# Patient Record
Sex: MALE | Race: WHITE | Employment: FULL TIME | URBAN - METROPOLITAN AREA
[De-identification: names, ages, dates, MRNs, and addresses within clinical notes are randomized per-mention and may not be internally consistent; named-entity substitution may affect disease eponyms.]

---

## 2018-05-25 ENCOUNTER — OFFICE VISIT (OUTPATIENT)
Dept: ORTHOPEDIC SURGERY | Age: 58
End: 2018-05-25

## 2018-05-25 VITALS — WEIGHT: 165 LBS | BODY MASS INDEX: 23.62 KG/M2 | HEIGHT: 70 IN

## 2018-05-25 DIAGNOSIS — M25.512 LEFT SHOULDER PAIN, UNSPECIFIED CHRONICITY: Primary | ICD-10-CM

## 2018-05-25 PROCEDURE — 99203 OFFICE O/P NEW LOW 30 MIN: CPT | Performed by: ORTHOPAEDIC SURGERY

## 2018-05-25 RX ORDER — ATORVASTATIN CALCIUM 10 MG/1
10 TABLET, FILM COATED ORAL
COMMUNITY
Start: 2017-08-25

## 2018-05-25 RX ORDER — IBUPROFEN 200 MG
TABLET ORAL
COMMUNITY

## 2018-05-25 RX ORDER — ASPIRIN 81 MG/1
TABLET ORAL
COMMUNITY

## 2018-07-06 ENCOUNTER — OFFICE VISIT (OUTPATIENT)
Dept: ORTHOPEDIC SURGERY | Age: 58
End: 2018-07-06

## 2018-07-06 VITALS — BODY MASS INDEX: 23.62 KG/M2 | HEIGHT: 70 IN | WEIGHT: 165 LBS

## 2018-07-06 DIAGNOSIS — M25.512 LEFT SHOULDER PAIN, UNSPECIFIED CHRONICITY: Primary | ICD-10-CM

## 2018-07-06 PROCEDURE — 99213 OFFICE O/P EST LOW 20 MIN: CPT | Performed by: ORTHOPAEDIC SURGERY
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
## 2018-07-23 ENCOUNTER — OFFICE VISIT (OUTPATIENT)
Dept: ORTHOPEDIC SURGERY | Age: 58
End: 2018-07-23

## 2018-07-23 VITALS — BODY MASS INDEX: 23.62 KG/M2 | WEIGHT: 165 LBS | HEIGHT: 70 IN

## 2018-07-23 DIAGNOSIS — M25.512 LEFT SHOULDER PAIN, UNSPECIFIED CHRONICITY: Primary | ICD-10-CM

## 2018-07-23 PROCEDURE — 99213 OFFICE O/P EST LOW 20 MIN: CPT | Performed by: ORTHOPAEDIC SURGERY
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
## 2018-07-27 ENCOUNTER — HOSPITAL ENCOUNTER (OUTPATIENT)
Dept: PHYSICAL THERAPY | Age: 58
Setting detail: THERAPIES SERIES
Discharge: HOME | End: 2018-07-27
Payer: COMMERCIAL

## 2018-07-27 PROCEDURE — G8984 CARRY CURRENT STATUS: HCPCS | Performed by: PHYSICAL THERAPIST

## 2018-07-27 PROCEDURE — 97110 THERAPEUTIC EXERCISES: CPT | Performed by: PHYSICAL THERAPIST

## 2018-07-27 PROCEDURE — G8985 CARRY GOAL STATUS: HCPCS | Performed by: PHYSICAL THERAPIST

## 2018-07-27 PROCEDURE — 97016 VASOPNEUMATIC DEVICE THERAPY: CPT | Performed by: PHYSICAL THERAPIST

## 2018-07-27 PROCEDURE — 97161 PT EVAL LOW COMPLEX 20 MIN: CPT | Performed by: PHYSICAL THERAPIST

## 2018-07-27 NOTE — PLAN OF CARE
[]Cognitive Function, education/learning barriers              []Environmental, home barriers              []profession/work barriers  []past PT/medical experience  []other:  Justification: Patient is an active 67 y/o M with no anticipated factors that will affect rehab potential.     Falls Risk Assessment (30 days): no formal assessment, but not intervention anticipated  [x] Falls Risk assessed and no intervention required. [] Falls Risk assessed and Patient requires intervention due to being higher risk   TUG score (>12s at risk):     [] Falls education provided, including       G-Codes:  PT G-Codes  Functional Assessment Tool Used: Quick Dash  Score: 18%  Functional Limitation: Carrying, moving and handling objects  Carrying, Moving and Handling Objects Current Status (): At least 1 percent but less than 20 percent impaired, limited or restricted  Carrying, Moving and Handling Objects Goal Status ():  At least 1 percent but less than 20 percent impaired, limited or restricted    ASSESSMENT:   Functional Impairments   []Noted spinal or UE joint hypomobility   []Noted spinal or UE joint hypermobility   [x]Decreased UE functional ROM (slightly)   [x]Decreased UE functional strength   []Abnormal reflexes/sensation/myotomal/dermatomal deficits   [x]Decreased RC/scapular/core strength and neuromuscular control   []other:      Functional Activity Limitations (from functional questionnaire and intake)   []Reduced ability to tolerate prolonged functional positions   []Reduced ability or difficulty with changes of positions or transfers between positions   []Reduced ability to maintain good posture and demonstrate good body mechanics with sitting, bending, and lifting   [] Reduced ability or tolerance with driving and/or computer work   []Reduced ability to sleep   [x]Reduced ability to perform lifting, reaching, carrying tasks   [x]Reduced ability to tolerate impact through UE   [x]Reduced ability to reach

## 2018-07-27 NOTE — FLOWSHEET NOTE
[facility], [address] [place]  Phone: [phone]  Fax [phone]    Physical Therapy Daily Treatment Note  Date:  2018    Patient Name:  Caleb Velásquez    :  1960  MRN: [id number]  Restrictions/Precautions:  Patient denied, none present  Physician Information:  Referring Practitioner: Dr. Liliam Sidhu  Medical/Treatment Diagnosis Information:  · Diagnosis: L shoulder pain (M25.512)  · Treatment Diagnosis:Left shoulder pain (M25.512), Left shoudler weakness (R53.1)                                  [x] Conservative / [] Surgical - DOS:  Therapy Diagnosis/Practice Pattern:  Practice Pattern E: Localized Inflammation  Insurance/Certification information:  PT Insurance Information: [address] [place] [address] of [facility] signed: [] YES  [] NO  Number of Comorbidities:  [x]0     []1-2    []3+  Date of Patient follow up with Physician:  18    G-Code (if applicable):      Date G-Code Applied:  18  PT G-Codes  Functional Assessment Tool Used: Susanne Sousa  Score: 18%  Functional Limitation: Carrying, moving and handling objects  Carrying, Moving and Handling Objects Current Status (): At least 1 percent but less than 20 percent impaired, limited or restricted  Carrying, Moving and Handling Objects Goal Status ():  At least 1 percent but less than 20 percent impaired, limited or restricted    Progress Note: [x]  Yes  []  No  Next due by: Visit #10        Latex Allergy:  [x]NO      []YES  Preferred Language for Healthcare:   [x]English       []other:    Visit # Insurance Allowable Reporting Period   1 Check auth Begin Date: 2018               End Date:      RECERT DUE BY: 6 weeks     SUBJECTIVE:  See eval    OBJECTIVE: See eval  Observation:  Palpation:     Test used Initial score Current Score   Pain Summary VAS 0-3/10    Functional questionnaire QDash 18%    ROM flexion 168          ABD      IR T8    Strength flexion 4+     ER 4 pain     ABD 4 pain     IR 4 pain       RESTRICTIONS/PRECAUTIONS: none present, patient denied   MRI right shoulder dated 7/13/18 demonstrates degenerative changes of the acromioclavicular joint moderate in severity. Evidence of subacromial impingement with thickening of the coracoacromial ligament and inflammation around the bursa in the subacromial space with mild subscapularis tendinopathy and inflammation in the rotator interval    Exercises/Interventions:   Therapeutic Ex Sets/reps Notes   Posterior capsule stretch 4 x 20\" HEP   Sleeper stretch 4 x 20\" HEP   S/L ER X 20 HEP3#   S/L ABD X 20 HEP;3#   No Money X 20 HEP   Corner Stretch 4 x 20\" HEP   IR stretch with strap 4 x 20\" HEP   Doorway stretch 4 x 20\" HEP   SL ADD stretch 4 x 20\"    True Stretch NV    TB ER/IR NV    3D wall slide NV    Lateral wall slide NV                                  Manual Intervention     PROM 3'                                  NMR re-education                                                 Therapeutic Exercise and NMR EXR  [x] (23917) Provided verbal/tactile cueing for activities related to strengthening, flexibility, endurance, ROM  for improvements in scapular, scapulothoracic and UE control with self care, reaching, carrying, lifting, house/yardwork, driving/computer work.    [] (24078) Provided verbal/tactile cueing for activities related to improving balance, coordination, kinesthetic sense, posture, motor skill, proprioception  to assist with  scapular, scapulothoracic and UE control with self care, reaching, carrying, lifting, house/yardwork, driving/computer work.     Therapeutic Activities:    [] (72157 or 40430) Provided verbal/tactile cueing for activities related to improving balance, coordination, kinesthetic sense, posture, motor skill, proprioception and motor activation to allow for proper function of scapular, scapulothoracic and UE control with self care, carrying, lifting, driving/computer work.     Home Exercise Program:    [x] (99294) Reviewed/Progressed HEP activities related to strengthening, flexibility, endurance, ROM of scapular, scapulothoracic and UE control with self care, reaching, carrying, lifting, house/yardwork, driving/computer work  [] (52927) Reviewed/Progressed HEP activities related to improving balance, coordination, kinesthetic sense, posture, motor skill, proprioception of scapular, scapulothoracic and UE control with self care, reaching, carrying, lifting, house/yardwork, driving/computer work      Manual Treatments:  PROM / STM / Oscillations-Mobs:  G-I, II, III, IV (PA's, Inf., Post.)  [x] (17969) Provided manual therapy to mobilize soft tissue/joints of cervical/CT, scapular GHJ and UE for the purpose of modulating pain, promoting relaxation,  increasing ROM, reducing/eliminating soft tissue swelling/inflammation/restriction, improving soft tissue extensibility and allowing for proper ROM for normal function with self care, reaching, carrying, lifting, house/yardwork, driving/computer work    Modalities:  Vaso x 15'    Charges:  Timed Code Treatment Minutes: 25   Total Treatment Minutes: 60     [x] EVAL (LOW) 28911 (typically 20 minutes face-to-face)  [] EVAL (MOD) 82603 (typically 30 minutes face-to-face)  [] EVAL (HIGH) 423 8935 (typically 45 minutes face-to-face)  [] RE-EVAL     [x] GF(14155) x  2   [] IONTO  [] NMR (98900) x      [x] VASO  [] Manual ([id number]) x       [] Other:  [] TA x       [] Mech Traction (84802)  [] ES(attended) (67884)      [] ES (un) (03497):     Letty Pina stated goal: pain to go away     Therapist goals for Patient:   Short Term Goals: To be achieved in: 2 weeks  1. Independent in HEP and progression per patient tolerance, in order to prevent re-injury. 2. Patient will have a decrease in pain to facilitate improvement in movement, function, and ADLs as indicated by Functional Deficits.     Long Term Goals: To be achieved in: 6 weeks  1. Disability index score of 11% or less for the [facility] to assist with reaching prior level of function. 2. Patient will demonstrate increased AROM to WNL R UE to allow for proper joint functioning as indicated by patients Functional Deficits. 3. Patient will demonstrate an increase in Strength to at least 4+/5 to allow for proper functional mobility as indicated by patients Functional Deficits. 4. Patient will return to all functional activities without increased symptoms or restriction. 5. Patient will return to gym weight lifting routine without increased symptoms.             New or Updated Goals (if applicable):  [x] No change to goals established upon initial eval/last progress note:  New Goals:    Progression Towards Functional goals:   [] Patient is progressing as expected towards functional goals listed. [] Progression is slowed due to complexities listed. [] Progression has been slowed due to co-morbidities. [x] Plan just implemented, too soon to assess goals progression  [] Other:     ASSESSMENT:    [] Improvement noted relative to goals:  [] No Improvement noted related to goals:  Summary/Patient's response to treatment: See Eval    Treatment/Activity Tolerance:  [x] Patient tolerated treatment well [] Patient limited by fatique  [] Patient limited by pain  [] Patient limited by other medical complications  [] Other:     Prognosis: [x] Good [] Fair  [] Poor    Patient Requires Follow-up: [x] Yes  [] No    PLAN: See eval  [] Continue per plan of care [] Alter current plan (see comments)  [x] Plan of care initiated [] Hold pending MD visit [] Discharge    Electronically signed by: ALOK Ulloa SPT  Therapist was present, directed the patient's care, made skilled judgement, and was responsible for assessment and treatment of the patient.

## 2018-08-02 ENCOUNTER — HOSPITAL ENCOUNTER (OUTPATIENT)
Dept: PHYSICAL THERAPY | Age: 58
Setting detail: THERAPIES SERIES
Discharge: HOME | End: 2018-08-02
Payer: COMMERCIAL

## 2018-08-02 PROCEDURE — 97016 VASOPNEUMATIC DEVICE THERAPY: CPT | Performed by: PHYSICAL THERAPIST

## 2018-08-02 PROCEDURE — 97110 THERAPEUTIC EXERCISES: CPT | Performed by: PHYSICAL THERAPIST

## 2018-08-02 NOTE — FLOWSHEET NOTE
Increased flexion ROM  Palpation:     Test used Initial score Current Score   Pain Summary VAS 0-3/10    Functional questionnaire QDash 18%    ROM flexion 168          ABD      IR T8    Strength flexion 4+     ER 4 pain     ABD 4 pain     IR 4 pain       RESTRICTIONS/PRECAUTIONS: none present, patient denied   MRI right shoulder dated 7/13/18 demonstrates degenerative changes of the acromioclavicular joint moderate in severity.   Evidence of subacromial impingement with thickening of the coracoacromial ligament and inflammation around the bursa in the subacromial space with mild subscapularis tendinopathy and inflammation in the rotator interval    Exercises/Interventions:   Therapeutic Ex Sets/reps Notes   Posterior capsule stretch 4 x 20\" HEP   Sleeper stretch 4 x 20\" HEP   S/L ER X 20 HEP4#   S/L ABD X 20 HEP;4#   No Money X 20 Red; HEP   Corner Stretch  HEP   IR stretch with strap 4 x 20\" HEP   Doorway stretch  HEP   SL ADD stretch     True Stretch  -flexion squat  -IR  -ER low 4 x 20\"    TB ER/IR X 15 HEP; grn ER, blue IR   3D wall slide X 5 HEP   Lateral wall slide NV    SA punch; forward/backward; side/side X 20 HEP   Prone extension X 20 HEP; 4#    X 5 each side                   Manual Intervention     PROM 5'                                  NMR re-education                                                 Therapeutic Exercise and NMR EXR  [x] (79518) Provided verbal/tactile cueing for activities related to strengthening, flexibility, endurance, ROM  for improvements in scapular, scapulothoracic and UE control with self care, reaching, carrying, lifting, house/yardwork, driving/computer work.    [] (83348) Provided verbal/tactile cueing for activities related to improving balance, coordination, kinesthetic sense, posture, motor skill, proprioception  to assist with  scapular, scapulothoracic and UE control with self care, reaching, carrying, lifting, house/yardwork, driving/computer work.    Therapeutic Activities:    [] (22548 or 37847) Provided verbal/tactile cueing for activities related to improving balance, coordination, kinesthetic sense, posture, motor skill, proprioception and motor activation to allow for proper function of scapular, scapulothoracic and UE control with self care, carrying, lifting, driving/computer work.      Home Exercise Program:    [x] (83882) Reviewed/Progressed HEP activities related to strengthening, flexibility, endurance, ROM of scapular, scapulothoracic and UE control with self care, reaching, carrying, lifting, house/yardwork, driving/computer work  [] (28535) Reviewed/Progressed HEP activities related to improving balance, coordination, kinesthetic sense, posture, motor skill, proprioception of scapular, scapulothoracic and UE control with self care, reaching, carrying, lifting, house/yardwork, driving/computer work      Manual Treatments:  PROM / STM / Oscillations-Mobs:  G-I, II, III, IV (PA's, Inf., Post.)  [x] (64619) Provided manual therapy to mobilize soft tissue/joints of cervical/CT, scapular GHJ and UE for the purpose of modulating pain, promoting relaxation,  increasing ROM, reducing/eliminating soft tissue swelling/inflammation/restriction, improving soft tissue extensibility and allowing for proper ROM for normal function with self care, reaching, carrying, lifting, house/yardwork, driving/computer work    Modalities:  Vaso x 15'    Charges:  Timed Code Treatment Minutes: 35   Total Treatment Minutes: 50     [] EVAL (LOW) 68815 (typically 20 minutes face-to-face)  [] EVAL (MOD) 44107 (typically 30 minutes face-to-face)  [] EVAL (HIGH) 27069 (typically 45 minutes face-to-face)  [] RE-EVAL     [x] FL(74377) x  2   [] IONTO  [] NMR (96208) x      [x] VASO  [] Manual (23238) x       [] Other:  [] TA x       [] Mech Traction (47142)  [] ES(attended) (31880)      [] ES (un) (64570):     Suzie Graham stated goal: pain to go away     Therapist goals for

## 2018-08-10 ENCOUNTER — HOSPITAL ENCOUNTER (OUTPATIENT)
Dept: PHYSICAL THERAPY | Age: 58
Setting detail: THERAPIES SERIES
Discharge: HOME | End: 2018-08-10
Payer: COMMERCIAL

## 2018-08-10 PROCEDURE — 97110 THERAPEUTIC EXERCISES: CPT

## 2018-08-10 PROCEDURE — 97140 MANUAL THERAPY 1/> REGIONS: CPT

## 2018-08-10 NOTE — FLOWSHEET NOTE
range.    OBJECTIVE:  Observation: Increased flexion ROM  Palpation:     Test used Initial score Current Score   Pain Summary VAS 0-3/10    Functional questionnaire QDash 18%    ROM flexion 168          ABD      IR T8    Strength flexion 4+     ER 4 pain     ABD 4 pain     IR 4 pain       RESTRICTIONS/PRECAUTIONS: none present, patient denied   MRI right shoulder dated 7/13/18 demonstrates degenerative changes of the acromioclavicular joint moderate in severity.   Evidence of subacromial impingement with thickening of the coracoacromial ligament and inflammation around the bursa in the subacromial space with mild subscapularis tendinopathy and inflammation in the rotator interval    Exercises/Interventions:   Therapeutic Ex Sets/reps Notes   Posterior capsule stretch 4 x 20\" HEP   Sleeper stretch 4 x 20\" HEP   S/L ER X 20 HEP4#   S/L ABD X 20 HEP;4#   No Money X 20 Red; HEP   Corner Stretch  HEP   IR stretch with strap 4 x 20\" HEP   Doorway stretch  HEP   SL ADD stretch     True Stretch  -flexion squat  -IR  -ER low 4 x 20\"    TB row/ext  TB ER/IR 2 x 10  2 x 10 GTB  HEP; grn ER, blue IR   3D wall slide X 5 HEP   Lateral wall slide NV    SA punch; forward/backward; side/side X 20 5# HEP   Prone extension, row X 20 HEP; 4#    punch X 10 RTB                  Manual Intervention     PROM, jt mobs 6 min    Cross friction bicep tendon 3 min                             NMR re-education                                                 Therapeutic Exercise and NMR EXR  [x] (65793) Provided verbal/tactile cueing for activities related to strengthening, flexibility, endurance, ROM  for improvements in scapular, scapulothoracic and UE control with self care, reaching, carrying, lifting, house/yardwork, driving/computer work.    [] (42253) Provided verbal/tactile cueing for activities related to improving balance, coordination, kinesthetic sense, posture, motor skill, proprioception  to assist with  scapular, scapulothoracic and UE control with self care, reaching, carrying, lifting, house/yardwork, driving/computer work. Therapeutic Activities:    [] (02396 or 03016) Provided verbal/tactile cueing for activities related to improving balance, coordination, kinesthetic sense, posture, motor skill, proprioception and motor activation to allow for proper function of scapular, scapulothoracic and UE control with self care, carrying, lifting, driving/computer work.      Home Exercise Program:    [x] (60929) Reviewed/Progressed HEP activities related to strengthening, flexibility, endurance, ROM of scapular, scapulothoracic and UE control with self care, reaching, carrying, lifting, house/yardwork, driving/computer work  [] (95712) Reviewed/Progressed HEP activities related to improving balance, coordination, kinesthetic sense, posture, motor skill, proprioception of scapular, scapulothoracic and UE control with self care, reaching, carrying, lifting, house/yardwork, driving/computer work      Manual Treatments:  PROM / STM / Oscillations-Mobs:  G-I, II, III, IV (PA's, Inf., Post.)  [x] (79277) Provided manual therapy to mobilize soft tissue/joints of cervical/CT, scapular GHJ and UE for the purpose of modulating pain, promoting relaxation,  increasing ROM, reducing/eliminating soft tissue swelling/inflammation/restriction, improving soft tissue extensibility and allowing for proper ROM for normal function with self care, reaching, carrying, lifting, house/yardwork, driving/computer work    Modalities:  CP x 15'    Charges:  Timed Code Treatment Minutes: 40   Total Treatment Minutes: 50     [] EVAL (LOW) 10788 (typically 20 minutes face-to-face)  [] EVAL (MOD) 39526 (typically 30 minutes face-to-face)  [] EVAL (HIGH) 13478 (typically 45 minutes face-to-face)  [] RE-EVAL     [x] PF(95319) x  2   [] IONTO  [] NMR (67519) x      [] VASO  [x] Manual (94832) x  1    [] Other:  [] TA x       [] Mech Traction (94566)  [] ES(attended) (48077)      [] ES (un) (05949):     Jacky Astudillo stated goal: pain to go away     Therapist goals for Patient:   Short Term Goals: To be achieved in: 2 weeks  1. Independent in HEP and progression per patient tolerance, in order to prevent re-injury. MET  2. Patient will have a decrease in pain to facilitate improvement in movement, function, and ADLs as indicated by Functional Deficits. MET     Long Term Goals: To be achieved in: 6 weeks  1. Disability index score of 11% or less for the [facility] to assist with reaching prior level of function. 2. Patient will demonstrate increased AROM to WNL R UE to allow for proper joint functioning as indicated by patients Functional Deficits. PROGRESSING  3. Patient will demonstrate an increase in Strength to at least 4+/5 to allow for proper functional mobility as indicated by patients Functional Deficits. PROGRESSING  4. Patient will return to all functional activities without increased symptoms or restriction. 5. Patient will return to gym weight lifting routine without increased symptoms.             New or Updated Goals (if applicable):  [x] No change to goals established upon initial eval/last progress note:  New Goals:    Progression Towards Functional goals:   [x] Patient is progressing as expected towards functional goals listed. [] Progression is slowed due to complexities listed. [] Progression has been slowed due to co-morbidities. [] Plan just implemented, too soon to assess goals progression  [] Other:     ASSESSMENT:    [x] Improvement noted relative to goals:  [] No Improvement noted related to goals:  Summary/Patient's response to treatment: Patient tolerated addition of true stretch, scapula exercises and theraband exercises well. HEP updated. Will continue to progress strengthening exercises and HEP.     Treatment/Activity Tolerance:  [x] Patient tolerated treatment well [] Patient limited by fatique  [] Patient limited by pain  []

## 2018-08-24 ENCOUNTER — HOSPITAL ENCOUNTER (OUTPATIENT)
Dept: PHYSICAL THERAPY | Age: 58
Setting detail: THERAPIES SERIES
Discharge: HOME | End: 2018-08-24
Payer: COMMERCIAL

## 2018-08-24 PROCEDURE — 97140 MANUAL THERAPY 1/> REGIONS: CPT

## 2018-08-24 PROCEDURE — 97110 THERAPEUTIC EXERCISES: CPT

## 2018-08-24 NOTE — FLOWSHEET NOTE
No    PLAN:   [x] Continue per plan of care [] Alter current plan (see comments)  [] Plan of care initiated [] Hold pending MD visit [] Discharge    Electronically signed by: ALOK Styles SPT  Therapist was present, directed the patient's care, made skilled judgement, and was responsible for assessment and treatment of the patient.

## 2018-08-31 ENCOUNTER — HOSPITAL ENCOUNTER (OUTPATIENT)
Dept: PHYSICAL THERAPY | Age: 58
Setting detail: THERAPIES SERIES
Discharge: HOME | End: 2018-08-31
Payer: COMMERCIAL

## 2018-08-31 PROCEDURE — 97140 MANUAL THERAPY 1/> REGIONS: CPT | Performed by: PHYSICAL THERAPIST

## 2018-08-31 PROCEDURE — 97110 THERAPEUTIC EXERCISES: CPT | Performed by: PHYSICAL THERAPIST

## 2018-08-31 NOTE — FLOWSHEET NOTE
provided. OBJECTIVE:  Observation: Increased flexion ROM  Palpation:     Test used Initial score Current Score   Pain Summary VAS 0-3/10    Functional questionnaire QDash 18%    ROM flexion 168          ABD      IR T8    Strength flexion 4+     ER 4 pain     ABD 4 pain     IR 4 pain       RESTRICTIONS/PRECAUTIONS: none present, patient denied   MRI right shoulder dated 7/13/18 demonstrates degenerative changes of the acromioclavicular joint moderate in severity.   Evidence of subacromial impingement with thickening of the coracoacromial ligament and inflammation around the bursa in the subacromial space with mild subscapularis tendinopathy and inflammation in the rotator interval    Exercises/Interventions:   Therapeutic Ex Sets/reps Notes   Posterior capsule stretch 4 x 20\" HEP   Sleeper stretch 4 x 20\" HEP   S/L ER X 20 HEP4#   S/L ABD X 20 HEP;4#   No Money 3 x 10 Red; HEP   Corner Stretch  HEP   IR stretch with strap 4 x 20\" HEP   Doorway stretch  HEP   SL ADD stretch     True Stretch  -flexion squat  -IR  -ER low X 10 each    TB row/ext  TB ER/IR 2 x 10  2 x 10 GTB  HEP; grn ER, blue IR   3D wall slide X 5 HEP   Lateral wall slide NV    SA punch; forward/backward; side/side, CW/CCW X 20 6# HEP   Prone extension, row X 20 HEP; 4#   Prone horizontal abd 0#   X 20      punch X 10 GTB                  Manual Intervention     PROM, jt mobs 6 min    Cross friction bicep tendon 3 min                             NMR re-education                                                 Therapeutic Exercise and NMR EXR  [x] (39733) Provided verbal/tactile cueing for activities related to strengthening, flexibility, endurance, ROM  for improvements in scapular, scapulothoracic and UE control with self care, reaching, carrying, lifting, house/yardwork, driving/computer work.    [] (74767) Provided verbal/tactile cueing for activities related to improving balance, coordination, kinesthetic sense, posture, motor skill, Patient limited by pain  [] Patient limited by other medical complications  [] Other:     Prognosis: [x] Good [] Fair  [] Poor    Patient Requires Follow-up: [x] Yes  [] No    PLAN:   [x] Continue per plan of care [] Alter current plan (see comments)  [] Plan of care initiated [] Hold pending MD visit [] Discharge    Electronically signed by: HARLEY Casey  Therapist was present, directed the patient's care, made skilled judgement, and was responsible for assessment and treatment of the patient.

## 2018-09-06 ENCOUNTER — OFFICE VISIT (OUTPATIENT)
Dept: ORTHOPEDIC SURGERY | Age: 58
End: 2018-09-06

## 2018-09-06 VITALS — HEIGHT: 70 IN | BODY MASS INDEX: 23.68 KG/M2

## 2018-09-06 DIAGNOSIS — M25.512 LEFT SHOULDER PAIN, UNSPECIFIED CHRONICITY: Primary | ICD-10-CM

## 2018-09-06 PROCEDURE — 99212 OFFICE O/P EST SF 10 MIN: CPT | Performed by: ORTHOPAEDIC SURGERY

## 2018-09-06 NOTE — PROGRESS NOTES
Chief Complaint  Follow-up (Left Shoulder: Subacromial impingement, RTC tendinitis, Biceps Tendinitis, and Mild AC joint Arthritis; in PT )      History of Present Illness:  Inessa Bajwa is a 66 y.o. y/o male who presents today for follow up of his left shoulder. He has been in physical therapy for subacromial impingement, rotator cuff tendinitis, biceps tendinitis, mild acromial clavicular joint DJD. He states at this point he has 98% of his normal and feels very good with minimal discomfort. He denies any new problems. Medical History  No past medical history on file. No past surgical history on file. Social History     Social History    Marital status:      Spouse name: N/A    Number of children: N/A    Years of education: N/A     Occupational History    Not on file. Social History Main Topics    Smoking status: Never Smoker    Smokeless tobacco: Never Used    Alcohol use Not on file    Drug use: Unknown    Sexual activity: Not on file     Other Topics Concern    Not on file     Social History Narrative    No narrative on file       No family history on file. Review of Systems  Pertinent items are noted in HPI  Review of systems reviewed from Patient History Form dated on 5/25/18 and available in the patient's chart under the Media tab. Vital Signs  There were no vitals filed for this visit. General Exam:   Constitutional: Patient is adequately groomed with no evidence of malnutrition  Mental Status: The patient is oriented to time, place and person. The patient's mood and affect are appropriate. Lymphatic: The lymphatic examination bilaterally reveals all areas to be without enlargement or induration. Neurological: The patient has good coordination. There is no weakness or sensory deficit.      Gait: Normal    Left shoulder  Examination  Inspection:  No atrophy or bruising, left upper extremity scarring unchanged    Palpation:  Minimal tenderness over biceps